# Patient Record
Sex: MALE | Race: WHITE | NOT HISPANIC OR LATINO | Employment: UNEMPLOYED | URBAN - NONMETROPOLITAN AREA
[De-identification: names, ages, dates, MRNs, and addresses within clinical notes are randomized per-mention and may not be internally consistent; named-entity substitution may affect disease eponyms.]

---

## 2018-12-29 ENCOUNTER — HISTORICAL (OUTPATIENT)
Dept: ADMINISTRATIVE | Facility: HOSPITAL | Age: 53
End: 2018-12-29

## 2023-03-05 ENCOUNTER — HOSPITAL ENCOUNTER (EMERGENCY)
Facility: HOSPITAL | Age: 58
Discharge: HOME | End: 2023-03-05
Attending: FAMILY MEDICINE
Payer: MEDICARE

## 2023-03-05 VITALS
SYSTOLIC BLOOD PRESSURE: 118 MMHG | OXYGEN SATURATION: 98 % | HEART RATE: 65 BPM | RESPIRATION RATE: 18 BRPM | WEIGHT: 229 LBS | HEIGHT: 66 IN | BODY MASS INDEX: 36.8 KG/M2 | TEMPERATURE: 96 F | DIASTOLIC BLOOD PRESSURE: 68 MMHG

## 2023-03-05 DIAGNOSIS — T14.90XA INJURY: ICD-10-CM

## 2023-03-05 DIAGNOSIS — G56.00 CARPAL TUNNEL SYNDROME, UNSPECIFIED LATERALITY: Primary | ICD-10-CM

## 2023-03-05 PROCEDURE — 99284 EMERGENCY DEPT VISIT MOD MDM: CPT | Mod: 25

## 2023-03-05 PROCEDURE — 96372 THER/PROPH/DIAG INJ SC/IM: CPT | Performed by: FAMILY MEDICINE

## 2023-03-05 PROCEDURE — 63600175 PHARM REV CODE 636 W HCPCS: Performed by: FAMILY MEDICINE

## 2023-03-05 PROCEDURE — 29125 APPL SHORT ARM SPLINT STATIC: CPT | Mod: RT

## 2023-03-05 RX ORDER — KETOROLAC TROMETHAMINE 30 MG/ML
60 INJECTION, SOLUTION INTRAMUSCULAR; INTRAVENOUS
Status: COMPLETED | OUTPATIENT
Start: 2023-03-05 | End: 2023-03-05

## 2023-03-05 RX ORDER — HYDROCODONE BITARTRATE AND ACETAMINOPHEN 5; 325 MG/1; MG/1
1 TABLET ORAL EVERY 4 HOURS PRN
Qty: 11 TABLET | Refills: 0 | Status: SHIPPED | OUTPATIENT
Start: 2023-03-05 | End: 2023-03-15

## 2023-03-05 RX ADMIN — KETOROLAC TROMETHAMINE 60 MG: 30 INJECTION, SOLUTION INTRAMUSCULAR; INTRAVENOUS at 10:03

## 2024-10-04 NOTE — ED PROVIDER NOTES
Encounter Date: 3/5/2023       History     Chief Complaint   Patient presents with    Hand Pain     PRESENTS TO ED PER POV AMBULATORY WITH C/O RT. HAND PAIN ONSET LAST NIGHT. STATES REMODELING BATHROOM AT HOME AND MAY HAVE INJURED DURING PROCESS        Patient presents with Hand Pain PRESENTS TO ED PER POV AMBULATORY WITH C/O RT. HAND PAIN ONSET LAST NIGHT. STATES REMODELING BATHROOM AT HOME AND MAY HAVE INJURED DURING PROCESS pt denies any trauma the pain is located in the median nerve distribution suspect carpal tunnel syndrome advised orthopedic follow-up        Review of patient's allergies indicates:  No Known Allergies  Past Medical History:   Diagnosis Date    Diabetes mellitus     Hypertension      History reviewed. No pertinent surgical history.  Family History   Family history unknown: Yes     Social History     Tobacco Use    Smoking status: Never    Smokeless tobacco: Never     Review of Systems   Constitutional:  Negative for activity change, appetite change, diaphoresis and fatigue.   HENT:  Negative for congestion, ear discharge, ear pain, hearing loss, postnasal drip, sinus pain and sore throat.    Eyes:  Negative for pain.   Respiratory:  Negative for apnea, choking and stridor.    Cardiovascular:  Negative for palpitations.   Gastrointestinal:  Negative for abdominal distention, abdominal pain, constipation and diarrhea.   Endocrine: Negative for cold intolerance and heat intolerance.   Genitourinary:  Negative for difficulty urinating, enuresis, frequency, penile discharge, penile pain, scrotal swelling and testicular pain.   Musculoskeletal:  Positive for arthralgias.        Positive wrist pain and hand pain numbness and tingling in the median nerve distribution positive Tinel sign   Neurological:  Negative for dizziness, seizures, syncope, facial asymmetry, light-headedness and headaches.   Hematological:  Negative for adenopathy.   Psychiatric/Behavioral:  Negative for agitation, behavioral  problems, decreased concentration and hallucinations. The patient is not hyperactive.      Physical Exam     Initial Vitals [03/05/23 0934]   BP Pulse Resp Temp SpO2   120/76 77 18 96.2 °F (35.7 °C) 98 %      MAP       --         Physical Exam    Nursing note and vitals reviewed.  Constitutional: He appears well-developed.   HENT:   Head: Normocephalic.   Eyes: Pupils are equal, round, and reactive to light.   Neck:   Normal range of motion.  Cardiovascular:  Normal rate, regular rhythm, normal heart sounds and intact distal pulses.           Pulmonary/Chest: No respiratory distress. He has no wheezes. He has no rales.   Abdominal: He exhibits no mass. There is no abdominal tenderness. There is no rebound and no guarding.   Musculoskeletal:      Right hand: No swelling, deformity or lacerations. Decreased range of motion.      Cervical back: Normal range of motion.      Comments: Generalized pain no tenderness more so in the median nerve distribution positive Tinel sign     Neurological: He is alert.   Skin: Skin is warm.   Psychiatric: He has a normal mood and affect. Thought content normal.       ED Course   Procedures  Labs Reviewed - No data to display       Imaging Results              X-Ray Hand 2 View Right (Final result)  Result time 03/05/23 10:08:01      Final result by Wilfred Esposito III, MD (03/05/23 10:08:01)                   Impression:      1. Chronic changes are present as described above.      Electronically signed by: Wilfred Esposito  Date:    03/05/2023  Time:    10:08               Narrative:    EXAMINATION:  STUDY: XR HAND 2 VIEW RIGHT    CLINICAL HISTORY AND TECHNIQUE:  Teri Orellana RT on 3/5/2023  9:53 AM    CURRENT CLINICAL HISTORY: ER PT    C/O RT HAND PAIN ONSET LAST PM    PMH: N/A    TECHNIQUE: 2 VIEW RT HAND    *PT UNABLE TO FAN OUT FINGERS FOR LATERAL VIEW*    TECHNOLOGIST:     TRANSPORT OK    COMPARISON:  None    FINDINGS:  Moderate degenerative changes are noted involving the 1st  carpometacarpal joint, radiocarpal joint, 1st metacarpophalangeal joint and D IP joints.  I see no definite fractures, dislocations, or other significant abnormalities.                                       Medications   ketorolac injection 60 mg (has no administration in time range)                 ED Course as of 03/05/23 1019   Sun Mar 05, 2023   1012 X-Ray Hand 2 View Right [CG]      ED Course User Index  [CG] Cristo Bello MD                 Clinical Impression:   Final diagnoses:  [T14.90XA] Injury               Cristo Bello MD  03/05/23 1024     Patient requests all Lab, Cardiology, and Radiology Results on their Discharge Instructions

## 2024-12-17 ENCOUNTER — HOSPITAL ENCOUNTER (EMERGENCY)
Facility: HOSPITAL | Age: 59
Discharge: HOME | End: 2024-12-17
Attending: EMERGENCY MEDICINE
Payer: MEDICARE

## 2024-12-17 VITALS
HEIGHT: 66 IN | RESPIRATION RATE: 17 BRPM | OXYGEN SATURATION: 99 % | TEMPERATURE: 98 F | BODY MASS INDEX: 35.36 KG/M2 | HEART RATE: 75 BPM | SYSTOLIC BLOOD PRESSURE: 135 MMHG | DIASTOLIC BLOOD PRESSURE: 85 MMHG | WEIGHT: 220 LBS

## 2024-12-17 DIAGNOSIS — M54.50 LEFT-SIDED LOW BACK PAIN WITHOUT SCIATICA, UNSPECIFIED CHRONICITY: Primary | ICD-10-CM

## 2024-12-17 PROCEDURE — 25000003 PHARM REV CODE 250

## 2024-12-17 PROCEDURE — 99284 EMERGENCY DEPT VISIT MOD MDM: CPT

## 2024-12-17 RX ORDER — KETOROLAC TROMETHAMINE 10 MG/1
10 TABLET, FILM COATED ORAL EVERY 6 HOURS
Qty: 20 TABLET | Refills: 0 | Status: SHIPPED | OUTPATIENT
Start: 2024-12-17 | End: 2024-12-22

## 2024-12-17 RX ORDER — HYDROCODONE BITARTRATE AND ACETAMINOPHEN 5; 325 MG/1; MG/1
1 TABLET ORAL EVERY 6 HOURS PRN
Qty: 12 TABLET | Refills: 0 | Status: SHIPPED | OUTPATIENT
Start: 2024-12-17 | End: 2024-12-20

## 2024-12-17 RX ORDER — KETOROLAC TROMETHAMINE 10 MG/1
10 TABLET, FILM COATED ORAL EVERY 6 HOURS
Qty: 20 TABLET | Refills: 0 | Status: SHIPPED | OUTPATIENT
Start: 2024-12-17 | End: 2024-12-17

## 2024-12-17 RX ORDER — CYCLOBENZAPRINE HCL 10 MG
10 TABLET ORAL 3 TIMES DAILY PRN
Qty: 15 TABLET | Refills: 0 | Status: SHIPPED | OUTPATIENT
Start: 2024-12-17 | End: 2024-12-17

## 2024-12-17 RX ORDER — CYCLOBENZAPRINE HCL 10 MG
10 TABLET ORAL 3 TIMES DAILY PRN
Qty: 15 TABLET | Refills: 0 | Status: SHIPPED | OUTPATIENT
Start: 2024-12-17 | End: 2024-12-22

## 2024-12-17 RX ORDER — HYDROCODONE BITARTRATE AND ACETAMINOPHEN 5; 325 MG/1; MG/1
1 TABLET ORAL EVERY 6 HOURS PRN
Qty: 12 TABLET | Refills: 0 | Status: SHIPPED | OUTPATIENT
Start: 2024-12-17 | End: 2024-12-17

## 2024-12-17 RX ORDER — HYDROCODONE BITARTRATE AND ACETAMINOPHEN 5; 325 MG/1; MG/1
1 TABLET ORAL
Status: COMPLETED | OUTPATIENT
Start: 2024-12-17 | End: 2024-12-17

## 2024-12-17 RX ADMIN — HYDROCODONE BITARTRATE AND ACETAMINOPHEN 1 TABLET: 5; 325 TABLET ORAL at 03:12

## 2024-12-17 NOTE — ED PROVIDER NOTES
Encounter Date: 12/17/2024       History     Chief Complaint   Patient presents with    Back Pain     Left lower back pain onset 2 days pta.   Denies any trauma/falls.      See MDM.     The history is provided by the patient. No  was used.     Review of patient's allergies indicates:  No Known Allergies  Past Medical History:   Diagnosis Date    Diabetes mellitus     Hypertension      Past Surgical History:   Procedure Laterality Date    ROTATOR CUFF REPAIR Bilateral     TENDON REPAIR Bilateral     knee     Family History   Family history unknown: Yes     Social History     Tobacco Use    Smoking status: Never    Smokeless tobacco: Never   Substance Use Topics    Alcohol use: Yes     Comment: occasionally     Review of Systems   Musculoskeletal:  Positive for back pain.   All other systems reviewed and are negative.      Physical Exam     Initial Vitals [12/17/24 1502]   BP Pulse Resp Temp SpO2   (!) 152/91 72 16 98.1 °F (36.7 °C) 95 %      MAP       --         Physical Exam    Nursing note and vitals reviewed.  Constitutional: He appears well-developed and well-nourished. He is not diaphoretic. No distress.   HENT:   Head: Normocephalic and atraumatic.   Neck:   No midline vertebral tenderness.    Cardiovascular:  Normal rate.           Pulmonary/Chest: No respiratory distress.   Musculoskeletal:        Legs:      Neurological: He is alert and oriented to person, place, and time.   Skin: Skin is warm and dry.   Psychiatric: He has a normal mood and affect. His behavior is normal.         ED Course   Procedures  Labs Reviewed - No data to display       Imaging Results    None          Medications   HYDROcodone-acetaminophen 5-325 mg per tablet 1 tablet (1 tablet Oral Given 12/17/24 1541)     Medical Decision Making  Pt is a 58 y/o male who presents with low back pain x2 days. States that he woke up with the pain one day. Was working on his car at home a few days ago and was bending in odd  positions, but otherwise denies heavy lifting falls or trauma. Has taken ibuprofen, tylenol, tramadol, naproxen for the pain without significant relief. States that the pain is left lateral, right above the buttock, feels like a stabbing/aching pain rated an 11/10. Pain is worse with walking and certain positions. Did have a fall in 2017 and had similar pain at that time.     Pt not toxic appearing, no acute distress. No midline tenderness. Discussed images with pt, politely declined xrays at this time. Educated on anti inflammatories, muscle relaxers and pain medication as needed. Discussed ROM as tolerated, moist heat, physical therapy. Strict ED precautions given. Pt expressed understanding and was in agreement with the plan.     Risk  Prescription drug management.      Additional MDM:   Differential Diagnosis:   Other: The following diagnoses were also considered and will be evaluated: lumbar strain, scoliosis and fracture.                                   Clinical Impression:  Final diagnoses:  [M54.50] Left-sided low back pain without sciatica, unspecified chronicity (Primary)          ED Disposition Condition    Discharge Stable          ED Prescriptions       Medication Sig Dispense Start Date End Date Auth. Provider    cyclobenzaprine (FLEXERIL) 10 MG tablet  (Status: Discontinued) Take 1 tablet (10 mg total) by mouth 3 (three) times daily as needed for Muscle spasms. 15 tablet 12/17/2024 12/17/2024 Nakita Preston PA    ketorolac (TORADOL) 10 mg tablet  (Status: Discontinued) Take 1 tablet (10 mg total) by mouth every 6 (six) hours. for 5 days 20 tablet 12/17/2024 12/17/2024 Nakita Preston PA    HYDROcodone-acetaminophen (NORCO) 5-325 mg per tablet  (Status: Discontinued) Take 1 tablet by mouth every 6 (six) hours as needed for Pain. 12 tablet 12/17/2024 12/17/2024 Nakita Preston PA    cyclobenzaprine (FLEXERIL) 10 MG tablet Take 1 tablet (10 mg total) by mouth 3 (three) times daily as needed for  Muscle spasms. 15 tablet 12/17/2024 12/22/2024 Nakita Preston PA    HYDROcodone-acetaminophen (NORCO) 5-325 mg per tablet Take 1 tablet by mouth every 6 (six) hours as needed for Pain. 12 tablet 12/17/2024 12/20/2024 Nakita Preston PA    ketorolac (TORADOL) 10 mg tablet Take 1 tablet (10 mg total) by mouth every 6 (six) hours. for 5 days 20 tablet 12/17/2024 12/22/2024 Nakita Preston PA          Follow-up Information       Follow up With Specialties Details Why Contact Info    Primary Care Provider  Call in 1 week  [phone]             Nakita Preston PA  12/17/24 8557

## 2024-12-17 NOTE — DISCHARGE INSTRUCTIONS
Toradol as needed for pain, can alternate with tylenol. If no relief can take a muscle relaxer or pain medicine. Follow-up with primary care for referral to physical therapy/discuss low back exercises with current physical therapist. Moist heat, mobilize as tolerated. Return with new or worsening symptoms.

## 2025-02-27 ENCOUNTER — HOSPITAL ENCOUNTER (OUTPATIENT)
Dept: RADIOLOGY | Facility: HOSPITAL | Age: 60
Discharge: HOME | End: 2025-02-27
Attending: INTERNAL MEDICINE
Payer: MEDICARE

## 2025-02-27 DIAGNOSIS — R22.9 LOCALIZED SUPERFICIAL SWELLING, MASS, OR LUMP: ICD-10-CM

## 2025-02-27 PROCEDURE — 76881 US COMPL JOINT R-T W/IMG: CPT | Mod: TC,RT
